# Patient Record
(demographics unavailable — no encounter records)

---

## 2024-11-15 NOTE — PHYSICAL EXAM
[Fully active, able to carry on all pre-disease performance without restriction] : Status 0 - Fully active, able to carry on all pre-disease performance without restriction [Thin] : thin [Normal] : affect appropriate [de-identified] : no tenderness along  [de-identified] : ptosis long resolved

## 2024-11-15 NOTE — ASSESSMENT
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 referred for further assessment of cavitary lung lesion  continue vemlidy for Hep B reactivation ppx for 12 months post completing chemotx  check CT N/C/A/P with contrast, MRI orbits with Gd check CMP, LDH   RV 3 mos [AdvancecareDate] : 08/12/2024

## 2024-11-15 NOTE — PHYSICAL EXAM
[Fully active, able to carry on all pre-disease performance without restriction] : Status 0 - Fully active, able to carry on all pre-disease performance without restriction [Thin] : thin [Normal] : affect appropriate [de-identified] : no tenderness along  [de-identified] : ptosis long resolved

## 2024-11-15 NOTE — HISTORY OF PRESENT ILLNESS
[Disease:__________________________] : Disease: [unfilled] [Treatment Protocol] : Treatment Protocol [Therapy: ___] : Therapy: [unfilled] [Cycle: ___] : Cycle: [unfilled] [Day: ___] : Day: [unfilled] [de-identified] : Ms. Saleh is a 72 yo woman with newly diagnosed DLBCL, GCB type, associated with frontal and supraorbital swelling and imaging showing complicated sinusitis with frontal collections & ? osteomyelitis (Pott's puffy tumor)  w/ epidural abscess/phlegmon with orbital involvement. She initially developed right frontal swelling around mid 9/2023 which has worsened and is now associated with  visible supraorbital swelling and ptosis bilaterally. She has had no pain, just mild discomfort. More recently, the ptosis has started to affect her vision.  MRI brain with Gd 10/25/23: 7.7 cm x 2.4 cm x 4.5 cm T1 hypointense and T2 isointense anterior frontal subgaleal collection demonstrating restricted diffusion and internal heterogeneity and enhancement. There is adjacent complete opacification of the left frontal sinus and anterior ethmoid sinus with postcontrast enhancement. Mucosal thickening is seen involving the right frontal and ethmoid and bilateral maxillary sinuses. There is suggestion of communication of the scalp soft tissue mass with the left anterior ethmoid sinus (18:42). Loss of normal T1 marrow signal along the bilateral anterior frontal bone adjacent to collection. A defects to the left fovea ethmoidalis is seen with indication of the intracranial compartment where there is a thin epidural phlegmon/abscess along the anterior cranial fossa more asymmetrically placed to the left. There is extension of the subgaleal collection into the medial bilateral orbital roofs versus extension to the orbital roof into the subperiosteal space. No underlying cerebritis is seen in the frontal lobes bilaterally. No abnormal leptomeningeal enhancement is seen. No evidence of proptosis bilaterally.  The cavernous sinuses enhance symmetrically with contrast.  No acute intracranial hemorrhage, infarct, mass effect, midline shift, or hydrocephalus.  Moderate periventricular and subcortical T2/FLAIR hyperintensity, nonspecific likely sequela chronic microvascular changes. Size of the ventricles and sulci are mildly prominent, compatible with age-appropriate volume loss.  Major flow-voids are present T2-weighted imaging, consistent with their patency.  Right superficial parotid lesion measuring 9 mm x 8 mm and demonstrating diffusion restriction and contrast enhancement, indeterminate.  CT sinuses 10/25/23 Large infiltrating soft tissue lesion involving the left anterior ethmoid region, frontal sinuses, bilateral forehead/frontal scalp and upper nasal soft tissues with multiple foci of bony dehiscence. Intracranial involvement is better seen on comparison MRI brain. Differential again includes complicated sinusitis versus neoplasm.  10/27/23- Right infrabrow incisional biopsy of forehead lesion, Left endoscopic biopsy of ethmoid lesion.  Soft tissue masses were identified post infra-brow incision and left ethmoid  Significant post-obstructive mucus was noted from the superior  anterior ethmoid cells. Frozen section with indeterminate pathology.  Final pathology shows DLBCL, GCB type, without MYC, BCL2 and BCL6 rearrangement. p53, cyclin D1, c-Myc, CD30, DELIA were (-). Ki-67 was 50-60%.  When it became apparent intraoperatively that there was no ongoing infection, antibiotics were stopped. Ms. Saleh is ambulatory, has minimal pain, and has increasingly impaired vision due to bilateral ptosis. She is breathing easily and has no dysphagia or odynophagia. She has a prior h/o likely TIA. She has HLD. She has no constitutional c/o. [de-identified] : pending [de-identified] : GCB type Ki-67 50-60% p53, Myc (-); neg BCL2, BCL6, MYC rearrangements [FreeTextEntry1] : RCHOP   with Onpro    IT MTX x4 completed therapy  3/15./24 [de-identified] : DLBCL- completed all treatment 3/15/24 , L/P with IT MTX x 4 all negative  interim PET/CT: 1. Marked interval decrease in size and metabolism of bilateral frontal cutaneous/subcutaneous mass. Findings are compatible with response to interval therapy (Deauville 2), however, the area of the previously seen mass is incompletely imaged (previous study was performed as whole body, and current study as skull to thigh). If clinically indicated, a repeat study with limited imaging of the head may be performed. Alternatively, please request subsequent studies be performed from vertex to mid thigh.  2. Indeterminate left ventricular FDG activity, less extensive than on prior study, and not typical of physiologic activity. Cardiac MRI may be obtained for further evaluation.  3. Nonspecific hypermetabolism in lower esophagus/GE junction, slightly increased in intensity, may reflect inflammation. Please correlate clinically and with endoscopy, if indicated. Resolution of mild FDG activity in fundus of stomach.  4. Non-FDG-avid paranasal sinus opacification, similar in appearance. Correlate clinically for acute sinusitis.  5. Minimally FDG-avid biapical scarring and cavitary right lower lobe lesion and non-FDG-avid right upper lobe calcifications. Correlate clinically for history of infection. Consider dedicated CT of chest for further evaluation.  6. Mild, diffuse bone marrow hypermetabolism, increased as compared to prior study, likely is related to recent treatment with colony stimulating factors.  post therapy PET/CT   1.Resolution of FDG-avid cutaneous/subcutaneous bilateral frontal masses seen on baseline study dated 11/26/2023, and incompletely imaged on 1/27/2024.  2. Resolution of atypical left ventricular myocardial activity.  3. Nonspecific hypermetabolism in lower esophagus/GE junction is unchanged, possibly inflammation.  4. Non-FDG-avid left frontal sinus opacification, unchanged.  5. Minimally FDG-avid biapical scarring and cavitary right lower lobe lesion and non-FDG-avid right upper lobe calcifications, unchanged. Correlate clinically for history of infection. Consider dedicated CT of chest for further evaluation.  6. New FDG-avid focus, anterior aspect right second rib with questionable corresponding fracture on CT. Correlate clinically for history of trauma. Resolution of diffuse bone marrow hypermetabolism which likely was related to colitis stimulating factors.  no h/o trauma to that area has no constitutional c/o feels well overall no new neuro c/o Had an open incarcerated femoral hernia repair with SBR with uneventful recovery after 7/25/24 CT showing small bowel obstruction due to right inguinal hernia.  A 1.3 cm cavitary lesion in the right lower lung is unchanged. no new pulm c/o. Rarely smokes now.  moving to [facility]  s/p Mediport removal

## 2024-11-15 NOTE — HISTORY OF PRESENT ILLNESS
[Disease:__________________________] : Disease: [unfilled] [Treatment Protocol] : Treatment Protocol [Therapy: ___] : Therapy: [unfilled] [Cycle: ___] : Cycle: [unfilled] [Day: ___] : Day: [unfilled] [de-identified] : Ms. Saleh is a 72 yo woman with newly diagnosed DLBCL, GCB type, associated with frontal and supraorbital swelling and imaging showing complicated sinusitis with frontal collections & ? osteomyelitis (Pott's puffy tumor)  w/ epidural abscess/phlegmon with orbital involvement. She initially developed right frontal swelling around mid 9/2023 which has worsened and is now associated with  visible supraorbital swelling and ptosis bilaterally. She has had no pain, just mild discomfort. More recently, the ptosis has started to affect her vision.  MRI brain with Gd 10/25/23: 7.7 cm x 2.4 cm x 4.5 cm T1 hypointense and T2 isointense anterior frontal subgaleal collection demonstrating restricted diffusion and internal heterogeneity and enhancement. There is adjacent complete opacification of the left frontal sinus and anterior ethmoid sinus with postcontrast enhancement. Mucosal thickening is seen involving the right frontal and ethmoid and bilateral maxillary sinuses. There is suggestion of communication of the scalp soft tissue mass with the left anterior ethmoid sinus (18:42). Loss of normal T1 marrow signal along the bilateral anterior frontal bone adjacent to collection. A defects to the left fovea ethmoidalis is seen with indication of the intracranial compartment where there is a thin epidural phlegmon/abscess along the anterior cranial fossa more asymmetrically placed to the left. There is extension of the subgaleal collection into the medial bilateral orbital roofs versus extension to the orbital roof into the subperiosteal space. No underlying cerebritis is seen in the frontal lobes bilaterally. No abnormal leptomeningeal enhancement is seen. No evidence of proptosis bilaterally.  The cavernous sinuses enhance symmetrically with contrast.  No acute intracranial hemorrhage, infarct, mass effect, midline shift, or hydrocephalus.  Moderate periventricular and subcortical T2/FLAIR hyperintensity, nonspecific likely sequela chronic microvascular changes. Size of the ventricles and sulci are mildly prominent, compatible with age-appropriate volume loss.  Major flow-voids are present T2-weighted imaging, consistent with their patency.  Right superficial parotid lesion measuring 9 mm x 8 mm and demonstrating diffusion restriction and contrast enhancement, indeterminate.  CT sinuses 10/25/23 Large infiltrating soft tissue lesion involving the left anterior ethmoid region, frontal sinuses, bilateral forehead/frontal scalp and upper nasal soft tissues with multiple foci of bony dehiscence. Intracranial involvement is better seen on comparison MRI brain. Differential again includes complicated sinusitis versus neoplasm.  10/27/23- Right infrabrow incisional biopsy of forehead lesion, Left endoscopic biopsy of ethmoid lesion.  Soft tissue masses were identified post infra-brow incision and left ethmoid  Significant post-obstructive mucus was noted from the superior  anterior ethmoid cells. Frozen section with indeterminate pathology.  Final pathology shows DLBCL, GCB type, without MYC, BCL2 and BCL6 rearrangement. p53, cyclin D1, c-Myc, CD30, DELIA were (-). Ki-67 was 50-60%.  When it became apparent intraoperatively that there was no ongoing infection, antibiotics were stopped. Ms. Saleh is ambulatory, has minimal pain, and has increasingly impaired vision due to bilateral ptosis. She is breathing easily and has no dysphagia or odynophagia. She has a prior h/o likely TIA. She has HLD. She has no constitutional c/o. [de-identified] : pending [de-identified] : GCB type Ki-67 50-60% p53, Myc (-); neg BCL2, BCL6, MYC rearrangements [FreeTextEntry1] : RCHOP   with Onpro    IT MTX x4 completed therapy  3/15./24 [de-identified] : DLBCL- completed all treatment 3/15/24 , L/P with IT MTX x 4 all negative  interim PET/CT: 1. Marked interval decrease in size and metabolism of bilateral frontal cutaneous/subcutaneous mass. Findings are compatible with response to interval therapy (Deauville 2), however, the area of the previously seen mass is incompletely imaged (previous study was performed as whole body, and current study as skull to thigh). If clinically indicated, a repeat study with limited imaging of the head may be performed. Alternatively, please request subsequent studies be performed from vertex to mid thigh.  2. Indeterminate left ventricular FDG activity, less extensive than on prior study, and not typical of physiologic activity. Cardiac MRI may be obtained for further evaluation.  3. Nonspecific hypermetabolism in lower esophagus/GE junction, slightly increased in intensity, may reflect inflammation. Please correlate clinically and with endoscopy, if indicated. Resolution of mild FDG activity in fundus of stomach.  4. Non-FDG-avid paranasal sinus opacification, similar in appearance. Correlate clinically for acute sinusitis.  5. Minimally FDG-avid biapical scarring and cavitary right lower lobe lesion and non-FDG-avid right upper lobe calcifications. Correlate clinically for history of infection. Consider dedicated CT of chest for further evaluation.  6. Mild, diffuse bone marrow hypermetabolism, increased as compared to prior study, likely is related to recent treatment with colony stimulating factors.  post therapy PET/CT   1.Resolution of FDG-avid cutaneous/subcutaneous bilateral frontal masses seen on baseline study dated 11/26/2023, and incompletely imaged on 1/27/2024.  2. Resolution of atypical left ventricular myocardial activity.  3. Nonspecific hypermetabolism in lower esophagus/GE junction is unchanged, possibly inflammation.  4. Non-FDG-avid left frontal sinus opacification, unchanged.  5. Minimally FDG-avid biapical scarring and cavitary right lower lobe lesion and non-FDG-avid right upper lobe calcifications, unchanged. Correlate clinically for history of infection. Consider dedicated CT of chest for further evaluation.  6. New FDG-avid focus, anterior aspect right second rib with questionable corresponding fracture on CT. Correlate clinically for history of trauma. Resolution of diffuse bone marrow hypermetabolism which likely was related to colitis stimulating factors.  no h/o trauma to that area has no constitutional c/o feels well overall no new neuro c/o Had an open incarcerated femoral hernia repair with SBR with uneventful recovery after 7/25/24 CT showing small bowel obstruction due to right inguinal hernia.  A 1.3 cm cavitary lesion in the right lower lung is unchanged. no new pulm c/o. Rarely smokes now.  moving to [facility]  s/p Mediport removal

## 2024-11-24 NOTE — HISTORY OF PRESENT ILLNESS
[TextBox_4] : Interventional Pulmonology Consultation Note First Visit with IP: Nov 25, 2024, 2:30PM   Ms. FAJARDO is a 72-year-old with PMH of DLBCL, GCB type, TIA, HLD. Patient was referred to Interventional Pulmonology by Dr Singh for and abnormal CT-Scan of the chest.    Patient most recent PET/CT scan of the Chest done on _/_/__ at ______ was notable for the following:   Ms. FAJARDO  past medical history is notable for: Anticoagulation: [ ] Emphysema: [ ] Personal History of Lung Cancer: [ ] Family History of Lung Cancer: [ ] Previous imaging or biopsy:  [ ] History of Fungal or Mycobacterial Infections:  [ ] History of Autoimmune Conditions: [ ] Smoking history of []   Today She is [ ]

## 2025-03-16 NOTE — HISTORY OF PRESENT ILLNESS
[Disease:__________________________] : Disease: [unfilled] [Treatment Protocol] : Treatment Protocol [Therapy: ___] : Therapy: [unfilled] [Cycle: ___] : Cycle: [unfilled] [Day: ___] : Day: [unfilled] [de-identified] : Ms. Saleh is a 70 yo woman with newly diagnosed DLBCL, GCB type, associated with frontal and supraorbital swelling and imaging showing complicated sinusitis with frontal collections & ? osteomyelitis (Pott's puffy tumor)  w/ epidural abscess/phlegmon with orbital involvement. She initially developed right frontal swelling around mid 9/2023 which has worsened and is now associated with  visible supraorbital swelling and ptosis bilaterally. She has had no pain, just mild discomfort. More recently, the ptosis has started to affect her vision.  MRI brain with Gd 10/25/23: 7.7 cm x 2.4 cm x 4.5 cm T1 hypointense and T2 isointense anterior frontal subgaleal collection demonstrating restricted diffusion and internal heterogeneity and enhancement. There is adjacent complete opacification of the left frontal sinus and anterior ethmoid sinus with postcontrast enhancement. Mucosal thickening is seen involving the right frontal and ethmoid and bilateral maxillary sinuses. There is suggestion of communication of the scalp soft tissue mass with the left anterior ethmoid sinus (18:42). Loss of normal T1 marrow signal along the bilateral anterior frontal bone adjacent to collection. A defects to the left fovea ethmoidalis is seen with indication of the intracranial compartment where there is a thin epidural phlegmon/abscess along the anterior cranial fossa more asymmetrically placed to the left. There is extension of the subgaleal collection into the medial bilateral orbital roofs versus extension to the orbital roof into the subperiosteal space. No underlying cerebritis is seen in the frontal lobes bilaterally. No abnormal leptomeningeal enhancement is seen. No evidence of proptosis bilaterally.  The cavernous sinuses enhance symmetrically with contrast.  No acute intracranial hemorrhage, infarct, mass effect, midline shift, or hydrocephalus.  Moderate periventricular and subcortical T2/FLAIR hyperintensity, nonspecific likely sequela chronic microvascular changes. Size of the ventricles and sulci are mildly prominent, compatible with age-appropriate volume loss.  Major flow-voids are present T2-weighted imaging, consistent with their patency.  Right superficial parotid lesion measuring 9 mm x 8 mm and demonstrating diffusion restriction and contrast enhancement, indeterminate.  CT sinuses 10/25/23 Large infiltrating soft tissue lesion involving the left anterior ethmoid region, frontal sinuses, bilateral forehead/frontal scalp and upper nasal soft tissues with multiple foci of bony dehiscence. Intracranial involvement is better seen on comparison MRI brain. Differential again includes complicated sinusitis versus neoplasm.  10/27/23- Right infrabrow incisional biopsy of forehead lesion, Left endoscopic biopsy of ethmoid lesion.  Soft tissue masses were identified post infra-brow incision and left ethmoid  Significant post-obstructive mucus was noted from the superior  anterior ethmoid cells. Frozen section with indeterminate pathology.  Final pathology shows DLBCL, GCB type, without MYC, BCL2 and BCL6 rearrangement. p53, cyclin D1, c-Myc, CD30, DELIA were (-). Ki-67 was 50-60%.  When it became apparent intraoperatively that there was no ongoing infection, antibiotics were stopped. Ms. Saleh is ambulatory, has minimal pain, and has increasingly impaired vision due to bilateral ptosis. She is breathing easily and has no dysphagia or odynophagia. She has a prior h/o likely TIA. She has HLD. She has no constitutional c/o. [de-identified] : pending [de-identified] : GCB type Ki-67 50-60% p53, Myc (-); neg BCL2, BCL6, MYC rearrangements [FreeTextEntry1] : RCHOP   with Onpro; IT MTX x 4; completed therapy 3/15./24 [de-identified] : DLBCL- completed all treatment 3/15/24 , L/P with IT MTX x 4 all negative   interim PET/CT Deauville 2 post therapy PET/CT  1.Resolution of FDG-avid cutaneous/subcutaneous bilateral frontal masses seen on baseline study dated 11/26/2023, and incompletely imaged on 1/27/2024. 2. Resolution of atypical left ventricular myocardial activity. 3. Nonspecific hypermetabolism in lower esophagus/GE junction is unchanged, possibly inflammation. 4. Non-FDG-avid left frontal sinus opacification, unchanged. 5. Minimally FDG-avid biapical scarring and cavitary right lower lobe lesion and non-FDG-avid right upper lobe calcifications, unchanged. Correlate clinically for history of infection. Consider dedicated CT of chest for further evaluation. 6. New FDG-avid focus, anterior aspect right second rib with questionable corresponding fracture on CT. Correlate clinically for history of trauma. Resolution of diffuse bone marrow hypermetabolism which likely was related to colitis stimulating factors.  CT C/A/P 11/2024 1. No evidence of recurrent/progressive disease within the chest, abdomen, or pelvis. 2. 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 3. Interval decrease in size of the cavitary nodule within the medial right lower lobe, now largely collapsed. Otherwise no significant interval change within the chest. 4. interval right femoral hernia repair.has no constitutional c/o feels well overall CT neck (-) MRI orbits 11/2024: previously seen bifrontal subcutaneous/subgaleal masses which  extended towards the perinasal and periorbital soft tissues more asymmetric towards the left side have resolved in comparison with 10/25/2023 compatible with a several response to treatment. Residual paranasal sinus inflammatory changes most notably affecting the left frontal sinus and outflow tract. Saw Dr. Cleveland for assessment of ? cavitary lung lesion, being followed; has not ordered AFB spuyum cults done. no new neuro c/o Had an open incarcerated femoral hernia repair with SBR with uneventful recovery after 7/25/24 CT showing small bowel obstruction due to  right inguinal hernia, s/p repair.  no new pulm c/o. Rarely smokes now.  moved to [facility]

## 2025-03-16 NOTE — ASSESSMENT
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 s/p R-CHOP x 6 with IT MTX x 4 interim and end of therapy PET/CT c/w CMR cardiac FDG uptake resolved  previously discussed that she now needs health maintenance measures such as  mammography, bone density and colonoscopy. need to complete assessment of cavitary lung lesion, she has not followed up with interventional pulmonology, collect sputum for AFB  completing vemlidy for Hep B reactivation ppx for 12 months post completing chemotx as of 3/15/25  plan f/u check CT N/C/A/P with contrast, MRI orbits with Gd end of 5/2025 check CMP, LDH, beta-2 MG, immunoelectrophoresis   RV 4 mos [AdvancecareDate] : 08/12/2024